# Patient Record
Sex: FEMALE | Race: WHITE | HISPANIC OR LATINO
[De-identification: names, ages, dates, MRNs, and addresses within clinical notes are randomized per-mention and may not be internally consistent; named-entity substitution may affect disease eponyms.]

---

## 2023-03-16 PROBLEM — Z00.00 ENCOUNTER FOR PREVENTIVE HEALTH EXAMINATION: Status: ACTIVE | Noted: 2023-03-16

## 2023-03-20 ENCOUNTER — NON-APPOINTMENT (OUTPATIENT)
Age: 58
End: 2023-03-20

## 2023-03-23 ENCOUNTER — NON-APPOINTMENT (OUTPATIENT)
Age: 58
End: 2023-03-23

## 2023-03-23 ENCOUNTER — APPOINTMENT (OUTPATIENT)
Dept: GYNECOLOGIC ONCOLOGY | Facility: CLINIC | Age: 58
End: 2023-03-23
Payer: MEDICAID

## 2023-03-23 VITALS
TEMPERATURE: 96.6 F | OXYGEN SATURATION: 97 % | DIASTOLIC BLOOD PRESSURE: 94 MMHG | HEIGHT: 66 IN | WEIGHT: 179 LBS | HEART RATE: 95 BPM | BODY MASS INDEX: 28.77 KG/M2 | SYSTOLIC BLOOD PRESSURE: 150 MMHG

## 2023-03-23 DIAGNOSIS — Z01.419 ENCOUNTER FOR GYNECOLOGICAL EXAMINATION (GENERAL) (ROUTINE) W/OUT ABNORMAL FINDINGS: ICD-10-CM

## 2023-03-23 PROCEDURE — 99386 PREV VISIT NEW AGE 40-64: CPT

## 2023-03-23 NOTE — ASSESSMENT
[FreeTextEntry1] : - Unremarkable GYN exam\par - Mammogram \par - GI referral\par - Follow up in 1 year or prn

## 2023-03-23 NOTE — CHIEF COMPLAINT
[FreeTextEntry1] : 56 y/o presents to establish new GYN care. Pt has no complaints. 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 \par FAMHX: denies fmhx of cancer\par \par Health Maintenance\par Last mammogram: last year\par Last colonoscopy: never\par

## 2023-03-23 NOTE — CHIEF COMPLAINT
[FreeTextEntry1] : 58 y/o presents to establish new GYN care. Pt has no complaints. 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 \par FAMHX: denies fmhx of cancer\par \par Health Maintenance\par Last mammogram: last year\par Last colonoscopy: never\par

## 2023-08-01 NOTE — ASSESSMENT
[FreeTextEntry1] : - Unremarkable GYN exam\par - Mammogram \par - GI referral\par - Follow up in 1 year or prn  Detail Level: Simple Render Risk Assessment In Note?: no Additional Notes: Patient consent was obtained to proceed with the visit and recommended plan of care after discussion of all risks and benefits, including the risks of COVID-19 exposure.